# Patient Record
Sex: MALE | Race: WHITE
[De-identification: names, ages, dates, MRNs, and addresses within clinical notes are randomized per-mention and may not be internally consistent; named-entity substitution may affect disease eponyms.]

---

## 2022-11-10 ENCOUNTER — HOSPITAL ENCOUNTER (OUTPATIENT)
Dept: HOSPITAL 97 - OR | Age: 62
Discharge: HOME | End: 2022-11-10
Attending: OPHTHALMOLOGY
Payer: COMMERCIAL

## 2022-11-10 VITALS — TEMPERATURE: 97 F | DIASTOLIC BLOOD PRESSURE: 54 MMHG | SYSTOLIC BLOOD PRESSURE: 101 MMHG

## 2022-11-10 VITALS — OXYGEN SATURATION: 99 %

## 2022-11-10 DIAGNOSIS — H53.8: ICD-10-CM

## 2022-11-10 DIAGNOSIS — H26.8: Primary | ICD-10-CM

## 2022-11-10 PROCEDURE — 08RJ3JZ REPLACEMENT OF RIGHT LENS WITH SYNTHETIC SUBSTITUTE, PERCUTANEOUS APPROACH: ICD-10-PCS

## 2022-11-10 PROCEDURE — 66982 XCAPSL CTRC RMVL CPLX WO ECP: CPT

## 2022-11-10 RX ADMIN — MOXIFLOXACIN HYDROCHLORIDE ONE DROPS: 5 SOLUTION/ DROPS OPHTHALMIC at 06:45

## 2022-11-10 RX ADMIN — KETOROLAC TROMETHAMINE ONE DROPS: 5 SOLUTION/ DROPS OPHTHALMIC at 07:15

## 2022-11-10 RX ADMIN — SODIUM CHONDROITIN SULFATE / SODIUM HYALURONATE ONE KIT: 0.55-0.5 INJECTION INTRAOCULAR at 08:17

## 2022-11-10 RX ADMIN — SODIUM CHONDROITIN SULFATE / SODIUM HYALURONATE ONE KIT: 0.55-0.5 INJECTION INTRAOCULAR at 07:59

## 2022-11-10 RX ADMIN — TROPICAMIDE ONE DROPS: 10 SOLUTION/ DROPS OPHTHALMIC at 07:00

## 2022-11-10 RX ADMIN — TROPICAMIDE ONE DROPS: 10 SOLUTION/ DROPS OPHTHALMIC at 07:15

## 2022-11-10 RX ADMIN — CYCLOPENTOLATE HYDROCHLORIDE ONE DROPS: 20 SOLUTION/ DROPS OPHTHALMIC at 06:45

## 2022-11-10 RX ADMIN — KETOROLAC TROMETHAMINE ONE DROPS: 5 SOLUTION/ DROPS OPHTHALMIC at 07:00

## 2022-11-10 RX ADMIN — KETOROLAC TROMETHAMINE ONE DROPS: 5 SOLUTION/ DROPS OPHTHALMIC at 06:45

## 2022-11-10 RX ADMIN — CYCLOPENTOLATE HYDROCHLORIDE ONE DROPS: 20 SOLUTION/ DROPS OPHTHALMIC at 07:00

## 2022-11-10 RX ADMIN — MOXIFLOXACIN HYDROCHLORIDE ONE DROPS: 5 SOLUTION/ DROPS OPHTHALMIC at 07:00

## 2022-11-10 RX ADMIN — CYCLOPENTOLATE HYDROCHLORIDE ONE DROPS: 20 SOLUTION/ DROPS OPHTHALMIC at 07:15

## 2022-11-10 RX ADMIN — PHENYLEPHRINE HYDROCHLORIDE ONE DROPS: 100 SOLUTION/ DROPS OPHTHALMIC at 07:00

## 2022-11-10 RX ADMIN — MOXIFLOXACIN HYDROCHLORIDE ONE DROPS: 5 SOLUTION/ DROPS OPHTHALMIC at 07:15

## 2022-11-10 RX ADMIN — TROPICAMIDE ONE DROPS: 10 SOLUTION/ DROPS OPHTHALMIC at 06:45

## 2022-11-10 RX ADMIN — PHENYLEPHRINE HYDROCHLORIDE ONE DROPS: 100 SOLUTION/ DROPS OPHTHALMIC at 07:15

## 2022-11-10 RX ADMIN — PHENYLEPHRINE HYDROCHLORIDE ONE DROPS: 100 SOLUTION/ DROPS OPHTHALMIC at 06:45

## 2022-11-10 NOTE — OP
Date of Procedure:  11/10/2022



Surgeon:  Satnam Bender MD



Assistant:  None.



Preoperative Diagnosis:  Visually significant cataract, right eye.



Postoperative Diagnosis:  Visually significant cataract, right eye.



Procedure Performed:  Complex cataract extraction of right eye with placement of intraocular lens aft
er using trypan blue for poor visualization.



Description Of Procedure:  After being properly identified in preoperative holding, the patient was t
aken back to the operating room where a time-out was performed.  After the examination was performed,
 the patient was prepped and draped in the normal sterile fashion.  Examination of the eye underneath
 the operating microscope revealed a well dilated pupil with a poor red reflex.  Therefore, the decis
ion to use trypan blue in order to aid visualization was made.  The globe was grasped with a pair of 
0.12 forceps and a paracentesis wound was made in the 12 o'clock and 6 o'clock position where after t
rypan blue was instilled into the anterior chamber, allowed to sit for approximately 60 seconds, and 
then irrigated out.  This was repeated x1.  Thereafter, the main phaco incision was wound was made us
ing a keratome 2.4 mm temporally in a triplanar fashion.  The cystotome was used to initiate an anter
ior capsulotomy, which was then completed with Utrata forceps.  Hydrodissection and hydrodelineation 
were carefully carried out and thereafter the lens was removed in a standard divide and conquer techn
ique.  Of note, there was a significant leaf thick and tough at the epinuclear membrane, but this was
 removed in toto.  Once this had been removed, the phaco handpiece was exchanged for bimanual irrigat
ion aspiration handpieces, which were used to remove the remaining cortical material.  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
sterior capsular scarring fibrosis, which was not able to be removed because of its location was not 
amenable to a posterior capsulotomy.  Therefore, this was left and will be removed postoperatively.  
Capsular bag was inflated with additional viscoelastic and a Earl and Earl model DCB00 power 10
.0 diopters, serial #[id number] was instilled into the capsular bag and rotated into position and the
 viscoelastic thereafter removed again using the bimanual handpieces.  Once all viscoelastic had been
 removed, the lens appeared well centered.  The wounds were hydrated and found to be watertight and t
he procedure concluded with the patient tolerated the procedure extremely well as he was under genera
l anesthesia the entire time.  The patient was patched over TobraDex ointment and taken to the postop
erative holding area in stable condition.  There were no complications.  Estimated blood loss less th
an 1 mL.  No specimens were sent.  No drains were placed and the implants are as above.  The patient 
is to follow up with myself, Dr. Satnam Bender tomorrow morning in my office.





SPENCER/SANDRO

DD:  11/10/2022 08:33:46Voice ID:  [id number]

DT:  11/10/2022 09:53:50Report ID:  [id number]

## 2022-12-15 ENCOUNTER — HOSPITAL ENCOUNTER (OUTPATIENT)
Dept: HOSPITAL 97 - PRE | Age: 62
Discharge: HOME | End: 2022-12-15
Attending: OPHTHALMOLOGY
Payer: COMMERCIAL

## 2022-12-15 VITALS — TEMPERATURE: 97.3 F | DIASTOLIC BLOOD PRESSURE: 69 MMHG | SYSTOLIC BLOOD PRESSURE: 143 MMHG

## 2022-12-15 VITALS — OXYGEN SATURATION: 100 %

## 2022-12-15 DIAGNOSIS — H25.9: Primary | ICD-10-CM

## 2022-12-15 DIAGNOSIS — H54.7: ICD-10-CM

## 2022-12-15 PROCEDURE — 08RK3JZ REPLACEMENT OF LEFT LENS WITH SYNTHETIC SUBSTITUTE, PERCUTANEOUS APPROACH: ICD-10-PCS

## 2022-12-15 PROCEDURE — 66984 XCAPSL CTRC RMVL W/O ECP: CPT

## 2022-12-15 RX ADMIN — TOBRAMYCIN AND DEXAMETHASONE ONE APPL: 3; 1 OINTMENT OPHTHALMIC at 08:20

## 2022-12-15 RX ADMIN — EPINEPHRINE ONE MG: 1 INJECTION, SOLUTION INTRAMUSCULAR; SUBCUTANEOUS at 07:21

## 2022-12-15 RX ADMIN — BALANCED SALT SOLUTION ONE ML: 6.4; .75; .48; .3; 3.9; 1.7 SOLUTION OPHTHALMIC at 07:21

## 2022-12-15 RX ADMIN — TOBRAMYCIN AND DEXAMETHASONE ONE APPL: 3; 1 OINTMENT OPHTHALMIC at 07:34

## 2022-12-15 RX ADMIN — EPINEPHRINE ONE MG: 1 INJECTION, SOLUTION INTRAMUSCULAR; SUBCUTANEOUS at 07:59

## 2022-12-15 RX ADMIN — BALANCED SALT SOLUTION ONE ML: 6.4; .75; .48; .3; 3.9; 1.7 SOLUTION OPHTHALMIC at 07:40

## 2022-12-15 RX ADMIN — TOBRAMYCIN AND DEXAMETHASONE ONE APPL: 3; 1 OINTMENT OPHTHALMIC at 08:02

## 2022-12-15 RX ADMIN — SODIUM CHONDROITIN SULFATE / SODIUM HYALURONATE ONE KIT: 0.55-0.5 INJECTION INTRAOCULAR at 07:34

## 2022-12-15 RX ADMIN — SODIUM CHONDROITIN SULFATE / SODIUM HYALURONATE ONE KIT: 0.55-0.5 INJECTION INTRAOCULAR at 08:02

## 2022-12-15 NOTE — OP
Date of Procedure:  12/15/2022



Surgeon:  Satnam Bender MD



Preoperative Diagnosis:  Visually significant senile cataract, left eye.



Postoperative Diagnosis:  Visually significant senile cataract, left eye.



Procedure Performed:  Cataract extraction, left eye with placement of intraocular lens.



Description Of Procedure:  After being properly identified in the preoperative holding area, the lucina
ent was taken back to the operating room where a time-out was performed.  The patient was then preppe
d and draped in normal sterile fashion.  Examination of the eye underneath the operating microscope r
evealed a moderately dilated pupil as well as cataract.  The globe was grasped with a pair of 0.12 fo
rceps and paracentesis wound was made in the 6 o'clock and 12 o'clock position.  The anterior chamber
 was filled with Viscoat and the globe was thereafter grasped again with a pair of 0.12 forceps and a
 triplanar phaco incision wound was made temporally.  A continuous curvilinear capsulorrhexis was cre
ated using a cystotome and completed with Utrata forceps.  Hydrodissection and hydrodelineation were 
carried out using a Villa cannula resulting in free rotation of the lens nucleus.  The lens was there
after removed in a standard divide and conquer technique without complication.  After the nucleus was
 removed, the phaco handpiece was exchanged for bimanual irrigation aspiration handpieces and all cor
tical material was removed and the capsular bag polished.  The bag was then filled with viscoelastic 
and Earl and Earl, model ZCB00 IOL power 19.0 diopters, serial #[id number] was implanted into t
he capsular bag and dialed into position.  The bimanual irrigation aspiration handpieces were exchang
ed for a coaxial I and A and all remaining viscoelastic material was removed.  The wounds were then h
ydrated using a 30-gauge cannula with BSS and the wounds were all found to be watertight and the proc
edure thereafter concluded.  The lid speculum and drapes were removed and the patient patched over To
braDex ointment.  He was thereafter taken to the postoperative holding area in stable condition fidencio romero tolerated the procedure well, having been under general anesthesia the entire time.  There were no 
complications.  Estimated blood loss less than 1 mL.  No specimens were sent.  No drains were placed.
  Implants are as above.





JPG/MODL

DD:  12/15/2022 08:31:09Voice ID:  [id number]

DT:  12/15/2022 11:06:58Report ID:  [id number]